# Patient Record
Sex: MALE | Race: BLACK OR AFRICAN AMERICAN
[De-identification: names, ages, dates, MRNs, and addresses within clinical notes are randomized per-mention and may not be internally consistent; named-entity substitution may affect disease eponyms.]

---

## 2019-04-02 ENCOUNTER — HOSPITAL ENCOUNTER (EMERGENCY)
Dept: HOSPITAL 61 - ER | Age: 34
Discharge: HOME | End: 2019-04-02
Payer: COMMERCIAL

## 2019-04-02 VITALS — SYSTOLIC BLOOD PRESSURE: 150 MMHG | DIASTOLIC BLOOD PRESSURE: 82 MMHG

## 2019-04-02 VITALS — HEIGHT: 70 IN | WEIGHT: 310 LBS | BODY MASS INDEX: 44.38 KG/M2

## 2019-04-02 DIAGNOSIS — F32.9: ICD-10-CM

## 2019-04-02 DIAGNOSIS — R09.81: Primary | ICD-10-CM

## 2019-04-02 DIAGNOSIS — F20.9: ICD-10-CM

## 2019-04-02 DIAGNOSIS — R05: ICD-10-CM

## 2019-04-02 PROCEDURE — 99283 EMERGENCY DEPT VISIT LOW MDM: CPT

## 2019-04-02 NOTE — PHYS DOC
Past Medical History


Past Medical History:  Depression, Schizophrenia


 (JULIOCESAR JUSTIN)


Past Surgical History:  No Surgical History


 (JULIOCESAR JUSTIN)


Alcohol Use:  Rarely


Drug Use:  Marijuana


 (JULIOCESAR JUSTIN)





Adult General


Chief Complaint


Chief Complaint:  Congestion





HPI


HPI





32 y/o male presents to ER via POV for c/o cough, congestion and SOA for past 2 

days. He reports he has been exposed to children with flu like illness 

recently. He reports his cough has been nonprod. and denies any CP/

palpitations. He reports he has felt congested and has had sore throat denying 

any difficulty swallowing. He reports sxs have slightly improved since onset 

and he has been using OTC meds such as Vicks, mucinex, and nyquil. He denies N/V

/D or urinary sxs. He denies HA but states he does have some sinus congestion. 

He denies fever with current temp. 97.8. 





Pt is daily smoker. He denies any recent travel. 


 (JULIOCESAR JUSTIN)





Review of Systems


Review of Systems





Constitutional: Denies fever or chills. Reports bodyaches


Eyes: Denies change in visual acuity, redness, or eye pain []


HENT: Reports sinus congestion and sore throat- denies difficulty swallowing


Respiratory: Reports nonprod. cough and SOA- denies SOA increases with exertion


Cardiovascular: Denies CP/tightness


GI: Denies abdominal pain, nausea, vomiting, bloody stools or diarrhea []


: Denies urinary sxs


Musculoskeletal: Denies back/neck pain


Integument: Denies rash, swelling or skin lesions []


Neurologic: Denies headache, focal weakness or sensory changes []


Endocrine: Denies polyuria or polydipsia []





All other systems were reviewed and found to be within normal limits, except as 

documented in this note.


 (JULIOCESAR JUSTIN)





Current Medications


Current Medications





Current Medications








 Medications


  (Trade)  Dose


 Ordered  Sig/Keara  Start Time


 Stop Time Status Last Admin


Dose Admin


 


 Ibuprofen


  (Motrin)  800 mg  1X  ONCE  4/2/19 22:00


 4/2/19 22:00 DC 4/2/19 21:46


800 MG


 


 Oxymetazoline HCl


  (Afrin)  2 spray  1X  ONCE  4/2/19 22:00


 4/2/19 22:00 DC 4/2/19 21:46


2 SPRAY





 (JIM KUO DO)





Allergies


Allergies





Allergies








Coded Allergies Type Severity Reaction Last Updated Verified


 


  No Known Drug Allergies    8/3/15 No





 (JIM KUO DO)





Physical Exam


Physical Exam





Constitutional: Well developed, well nourished, no acute distress, non-toxic 

appearance. []


HENT: Normocephalic, atraumatic, bilateral ears with mild erythema at TM 

without bulging/perforation/purulent drainage, oropharynx moist- smoke odor on 

breath. Mild pharyngeal erythema without swelling- uvula midline, no oral 

exudates, nasal congestion bilat. without drainage- tender in maxillary sinuses 

without facial swelling


Eyes: Pupils equal, conjunctiva normal, no discharge. [] 


Neck: Normal range of motion, no tenderness, supple, no stridor/gross 

adenopathy. Trachea midline


Cardiovascular: Tachycardic heart rate on triage- during exam rechecked with 

regular rhythm in 80-90s, no murmur []


Lungs & Thorax:  Bilateral breath sounds clear to auscultation- resp. equal/

nonlabored. Good air movement throughout all lung fields


Abdomen: Bowel sounds normal, soft, no tenderness, no masses, no pulsatile 

masses. [] 


Skin: Warm, dry, no erythema, no rash. [] 


Back: No tenderness, no CVA tenderness. [] 


Extremities: No tenderness, no cyanosis, no clubbing, ROM intact, no edema. [] 


Neurologic: Alert and oriented X 3, normal motor function, normal sensory 

function, no focal deficits noted. []


Psychologic: Affect normal, judgement normal, mood normal. []


 (JULIOCESAR JUSTIN)





Current Patient Data


Vital Signs





 Vital Signs








  Date Time  Temp Pulse Resp B/P (MAP) Pulse Ox O2 Delivery O2 Flow Rate FiO2


 


4/2/19 20:50 97.8 118 20 150/82 (104) 96 Room Air  





 97.8       





 (JIM KUO DO)





EKG


EKG


[]


 (JULIOCESAR JUSTIN)





Radiology/Procedures


Radiology/Procedures


[]


 (JULIOCESAR JUSTIN)





Course & Med Decision Making


Course & Med Decision Making








Pt was evaluated for generalized cold like illness as had sinus congestion and 

sinus pressure. Plan of care was discussed with pt as his lung sounds were 

clear with good air movement. No cough noted during exam. Pt was afebrile 

although reported he had been exposed to others with flu like illness- flu test 

was offered and he preferred not to have this done. Pt opted for tx of Afrin 

and Ibuprofen while in the ER. After those administered on re-evaluation pt 

reports he is able to breath much better and is feeling much less congested. He 

is in no distress at time of re-eval. Discussed probable viral illness and 

advised on smoking cessation. Education provided on s&s to return to ER for and 

will provide with clinic/physician resource sheet for f/u. Pt advised on 

increasing fluids, Tylenol/ibuprofen PRN, and afrin use advising of not to use 

more than 3 days. Discharge instructions discussed. 


 (JULIOCESAR JUSTIN)





Dragon Disclaimer


Dragon Disclaimer


This electronic medical record was generated, in whole or in part, using a 

voice recognition dictation system.


 (JULIOCESAR JUSTIN)





Departure


Departure


Impression:  


 Primary Impression:  


 Congestion of nasal sinus


 Additional Impression:  


 Cough


Disposition:  01 HOME, SELF-CARE


Condition:  STABLE


Referrals:  


NO PCP (PCP)


Patient Instructions:  Cough, Adult, Sinus Headache, Smoking Cessation





Additional Instructions:  


Drink plenty of fluids. Avoid smoking.





You can take over-the-counter Tylenol and/or ibuprofen for pain and fever 

control. You can use Afrin for 3 days do not use longer as this will cause 

rebound symptoms and irritate your sinuses. If you prefer no sinus spray other 

options are allergy medications- zyrtec/allegra examples. Take as directed on 

container. 





If symptoms persist follow-up with your primary doctor for re-evaluation and 

further care.





Attending Signature


Attending Signature


I have reviewed the PA/NP's note and plan of care. I was available for 

consultation as needed during the patient's visit in the emergency department. 

I agree with the clinical impression, plan, and disposition.


 (JIM KUO DO)





Problem Qualifiers











JULIOCESAR JUSTIN Apr 2, 2019 21:35


JIM KUO DO Apr 21, 2019 17:52

## 2019-06-27 ENCOUNTER — HOSPITAL ENCOUNTER (EMERGENCY)
Dept: HOSPITAL 61 - ER | Age: 34
Discharge: HOME | End: 2019-06-27
Payer: COMMERCIAL

## 2019-06-27 VITALS — WEIGHT: 290 LBS | HEIGHT: 70 IN | BODY MASS INDEX: 41.52 KG/M2

## 2019-06-27 VITALS — SYSTOLIC BLOOD PRESSURE: 144 MMHG | DIASTOLIC BLOOD PRESSURE: 82 MMHG

## 2019-06-27 DIAGNOSIS — F20.9: ICD-10-CM

## 2019-06-27 DIAGNOSIS — H10.31: ICD-10-CM

## 2019-06-27 DIAGNOSIS — F17.200: ICD-10-CM

## 2019-06-27 DIAGNOSIS — H00.011: Primary | ICD-10-CM

## 2019-06-27 PROCEDURE — 99283 EMERGENCY DEPT VISIT LOW MDM: CPT

## 2019-06-27 NOTE — PHYS DOC
Past Medical History


Past Medical History:  No Pertinent History, Depression, Schizophrenia


Past Surgical History:  No Surgical History


Smoking:  Greater than 1 pack/day


Alcohol Use:  Rarely


Drug Use:  Marijuana





Adult General


Chief Complaint


Chief Complaint:  EYE PROBLEMS





HPI


HPI


Patient is a 33-year-old male who presents with eye problems. 3 days ago he 

noticed that his right eye was tender to touch. This morning he woke up and his 

high was glued shut with pus was tender. He is wiped his eye with a wash rag 

this morning to clean it off and it opened. He has no known sick contacts. There

was no trauma and he does not remember getting anything in his eye. He denies 

fever and chills.





Review of Systems


Review of Systems


Constitutional: Denies fever or chills 


Eyes: Reports eye redness and eye pain 


HENT: Denies nasal congestion or sore throat


Respiratory: Denies cough or shortness of breath 


Cardiovascular: Denies chest pain or palpitations


GI: Denies abdominal pain, nausea, or vomiting


: Denies dysuria or hematuria


Musculoskeletal: Denies back pain or joint pain


Integument: Denies rash or skin lesions 


Neurologic: Denies headache, focal weakness or sensory changes





Complete systems were reviewed and found to be within normal limits, except as 

documented in this note.





Current Medications


Current Medications





Current Medications








 Medications


  (Trade)  Dose


 Ordered  Sig/Keara  Start Time


 Stop Time Status Last Admin


Dose Admin


 


 Erythromycin


  (Romycin)  0.25 inch  1X  ONCE  6/27/19 21:45


 6/27/19 21:46 DC 6/27/19 21:45


0.25 INCH


 


 Fluorescein Sodium


  (Ful-Mayi)  1 strip  1X  ONCE  6/27/19 21:45


 6/27/19 22:01 DC  





 


 Tetracaine HCl


  (Tetracaine)  2 drop  1X  ONCE  6/27/19 21:45


 6/27/19 22:01 DC  














Allergies


Allergies





Allergies








Coded Allergies Type Severity Reaction Last Updated Verified


 


  No Known Drug Allergies    8/3/15 No











Physical Exam


Physical Exam


Constitutional: Well developed, well nourished, no acute distress, non-toxic 

appearance


HENT: Normocephalic, atraumatic, oropharynx moist


Eyes: PERRL, EOMI, right conjunctiva mildly erythematous, no discharge, 

hordoleum present on right upper eyelid


Neck: Normal range of motion, no tenderness, supple


Cardiovascular: Heart rate normal, regular rhythm


Lungs & Thorax:  Bilateral breath sounds clear to auscultation, no wheezing


Abdomen: Soft, no tenderness


Skin: Warm, dry, no erythema, no rash


Back: No tenderness, no CVA tenderness


Extremities: No tenderness, ROM intact, no edema


Neurologic: Alert and oriented X 3, normal motor function, normal sensory 

function, no focal deficits noted


Psychologic: Affect normal, judgement normal, mood normal





Current Patient Data


Vital Signs





                                   Vital Signs








  Date Time  Temp Pulse Resp B/P (MAP) Pulse Ox O2 Delivery O2 Flow Rate FiO2


 


6/27/19 21:34 99.0 110 16 144/82 (102) 95 Room Air  





 99.0       











EKG


EKG


[]





Radiology/Procedures


Radiology/Procedures


[]





Course & Med Decision Making


Course & Med Decision Making


Mr. Walden is a 33-year-old male who presents with eye problems. He has a 3 day 

history of right eye tenderness woke up this morning with his eye shut with pus.

 On physical exam his right conjunctiva is mildly erythematous with a hordeolum 

present on upper eyelid. We gave him erythromycin gel and told him to use warm 

compresses. If it does not improve with the gel he should see an optometrist. 


Patient stable for discharge with outpatient follow-up with PCP. Discussed 

findings and plan with patient, who acknowledge understanding and agreement.


[]





Dragon Disclaimer


Dragon Disclaimer


This electronic medical record was generated, in whole or in part, using a voice

 recognition dictation system.





Departure


Departure


Impression:  


   Primary Impression:  


   Conjunctivitis


   Additional Impression:  


   Stye external


Disposition:  01 HOME, SELF-CARE


Condition:  STABLE


Referrals:  


JW MICHAEL MD (PCP)


Patient Instructions:  Conjunctivitis (Viral and Bacterial), Sty


Scripts


Erythromycin Base (Erythromycin) 1 Gm Oint...g.


0.25 INCH OP QID for 5 Days, MISC


   Prov: JIM KUO DO         6/27/19





Problem Qualifiers








   Primary Impression:  


   Conjunctivitis


   Conjunctivitis type:  acute  Acute conjunctivitis type:  unspecified  

   Laterality:  right  Qualified Codes:  H10.31 - Unspecified acute 

   conjunctivitis, right eye


   Additional Impression:  


   Stye external


   Laterality:  right  Eyelid:  unspecified eyelid  Qualified Codes:  H00.013 - 

   Hordeolum externum right eye, unspecified eyelid








JIM KUO DO             Jun 27, 2019 22:04

## 2019-08-05 ENCOUNTER — HOSPITAL ENCOUNTER (EMERGENCY)
Dept: HOSPITAL 61 - ER | Age: 34
Discharge: HOME | End: 2019-08-05
Payer: MEDICAID

## 2019-08-05 VITALS — BODY MASS INDEX: 42.95 KG/M2 | HEIGHT: 69 IN | WEIGHT: 290 LBS

## 2019-08-05 VITALS — SYSTOLIC BLOOD PRESSURE: 157 MMHG | DIASTOLIC BLOOD PRESSURE: 102 MMHG

## 2019-08-05 DIAGNOSIS — L03.113: Primary | ICD-10-CM

## 2019-08-05 DIAGNOSIS — F20.9: ICD-10-CM

## 2019-08-05 DIAGNOSIS — M25.521: ICD-10-CM

## 2019-08-05 PROCEDURE — 73080 X-RAY EXAM OF ELBOW: CPT

## 2019-08-05 PROCEDURE — 99284 EMERGENCY DEPT VISIT MOD MDM: CPT

## 2019-08-05 PROCEDURE — 93971 EXTREMITY STUDY: CPT

## 2019-08-05 NOTE — PHYS DOC
Past Medical History


Past Medical History:  No Pertinent History, Depression, Schizophrenia


Past Surgical History:  No Surgical History


Alcohol Use:  Rarely


Drug Use:  Marijuana





Adult General


Chief Complaint


Chief Complaint:  UPPER EXTREMITY PAIN





HPI


HPI





33-year-old male presents to ER via POV for complaints of right arm pain and 

swelling. Patient states he woke this morning having pain in his right elbow 

extending to his right hand with swelling from his wrist to his hand. She denies

fever. Patient denies any known injury. Patient states he had a little bit of 

tingling sensation in his forearm yesterday but woke this morning with increased

pain and swelling which wasn't there yesterday. Pt denies taking any ove

r-the-counter medications prior to arrival.





Pt is daily smoker. Denies prior blood clot hx or recent travel.





Review of Systems


Review of Systems





Constitutional: Denies fever or chills 


Respiratory: Denies cough or shortness of breath []


Cardiovascular: No additional information not addressed in HPI []


GI: Denies abdominal pain, nausea, vomiting, bloody stools or diarrhea []


: Denies urinary sxs


Musculoskeletal: Denies back pain. Reports rt elbow/forearm/wrist/hand pain with

 swelling in rt wrist/hand


Integument: Denies rash or skin lesions []


Neurologic: Denies headache, focal weakness or sensory changes []





All other systems were reviewed and found to be within normal limits, except as 

documented in this note.





Current Medications


Current Medications





Current Medications








 Medications


  (Trade)  Dose


 Ordered  Sig/Keara  Start Time


 Stop Time Status Last Admin


Dose Admin


 


 Acetaminophen/


 Hydrocodone Bitart


  (Lortab 5/325)  1 tab  1X  ONCE  8/5/19 22:00


 8/5/19 22:01 DC 8/5/19 21:57


1 TAB


 


 Ibuprofen


  (Motrin)  600 mg  1X  ONCE  8/5/19 22:00


 8/5/19 22:01 DC 8/5/19 21:57


600 MG











Allergies


Allergies





Allergies








Coded Allergies Type Severity Reaction Last Updated Verified


 


  No Known Drug Allergies    8/3/15 No











Physical Exam


Physical Exam





Constitutional: Well developed, well nourished, no acute distress, non-toxic 

appearance. []


HENT: Normocephalic, atraumatic, oropharynx moist, nose normal. []


Eyes: Pupils equal, conjunctiva normal, no discharge. [] 


Neck: Normal range of motion, supple


Cardiovascular: Heart rate regular rhythm- 90s during exam had been tachycardic 

at triage, no murmur []


Lungs & Thorax:  Bilateral breath sounds clear to auscultation- resp. equal/n

onlabored


Abdomen: Bowel sounds normal, soft, no tenderness


Skin: Warm, dry, no erythema, no rash. [] 


Back: No tenderness, no CVA tenderness. [] 


Extremities: No cyanosis, no clubbing, ROM intact. 2+ bilat. radial. Tender on 

palp. posterior rt elbow without palp. deformity. Tenderness extends into rt 

forearm/wrist/hand. Rt wrist/hand swelling with warmth in lateral side of rt 

hand- pt is  skin tone difficult to fully assess for cellulitic 

appearance. No induration. No visible wounds. Full  with rt hand. Brisk cap 

refill. 


Neurologic: Alert and oriented X 3, normal motor function, normal sensory 

function, no focal deficits noted. []


Psychologic: Affect normal, judgement normal, mood normal. []





Current Patient Data


Vital Signs





                                   Vital Signs








  Date Time  Temp Pulse Resp B/P (MAP) Pulse Ox O2 Delivery O2 Flow Rate FiO2


 


8/5/19 21:57   16  99 Room Air  


 


8/5/19 21:00 98.6 107  157/102 (120)    





 98.6       











EKG


EKG


[]





Radiology/Procedures


Radiology/Procedures


PROCEDURE: VENOUS UPPER EXTREMITY RIGHT





 


INDICATION: Arm swelling


 


COMPARISON: None.


 


TECHNIQUE: Grayscale, color and doppler ultrasound images were obtained of


the right upper extremity venous vasculature.


 


RIGHT:


 


No thrombus identified in the internal jugular, subclavian, axillary, 


brachial, basilic, cephalic, radial or ulnar veins.


 


 


IMPRESSION:


 


1. No thrombus identified in deep venous system of right upper extremity.


 


Electronically signed by: Anton Murdock MD (8/5/2019 11:21 PM) [facility]














DICTATED and SIGNED BY:     ANTON MURDOCK MD


DATE:     08/05/19 9736





Course & Med Decision Making


Course & Med Decision Making


Pertinent Imaging studies reviewed. (See chart for details)





Patient was evaluated in the ER for complaints of right arm pain with swelling 

in his wrist and hand. Patient denied any known injury. Patient had tenderness 

on exam and right elbow with no obvious deformity x-ray was obtained and was 

reviewed by Dr. Berry with no obvious displaced fractures. Patient had 

ultrasound which was negative for DVT right upper extremity. Discussed imaging 

results with patient as well as probable cellulitis. Discussed plans for home 

discharge with prescription for Keflex. Patient advised on Tylenol and or 

ibuprofen use. Education provided on signs and symptoms to return to ER. 

Discharge instructions were discussed. Patient to follow-up with primary care 

physician if symptoms persist or with any concerns. Patient reported following 

ibuprofen and dose of Norco while in the ER he did have improvement in pain. On 

reexamination he has had no change in appearance of right upper extremity with 

full range of motion. He is PMS intact in right upper extremity.





Dragon Disclaimer


Dragon Disclaimer


This electronic medical record was generated, in whole or in part, using a voice

 recognition dictation system.





Departure


Departure


Impression:  


   Primary Impression:  


   Cellulitis


   Additional Impression:  


   Arm pain, right


Disposition:  01 HOME, SELF-CARE


Condition:  STABLE


Referrals:  


JW MICHAEL MD (PCP)


Patient Instructions:  Cellulitis





Additional Instructions:  


Tylenol and/or ibuprofen as needed for pain as directed on container. 





Follow-up with your primary care physician if symptoms persist and for 

reevaluation as needed or return to the Emergency Room for further care.


Scripts


Cephalexin (KEFLEX) 500 Mg Capsule


1 CAP PO BID, #14 CAP 0 Refills


   Prov: JULIOCESAR JUSTIN         8/5/19





Problem Qualifiers











JULIOCESAR JUSTIN           Aug 5, 2019 23:30

## 2019-08-05 NOTE — RAD
INDICATION: Arm swelling

 

COMPARISON: None.

 

TECHNIQUE: Grayscale, color and doppler ultrasound images were obtained of

the right upper extremity venous vasculature.

 

RIGHT:

 

No thrombus identified in the internal jugular, subclavian, axillary, 

brachial, basilic, cephalic, radial or ulnar veins.

 

 

IMPRESSION:

 

1. No thrombus identified in deep venous system of right upper extremity.

 

Electronically signed by: Anton Linder MD (8/5/2019 11:21 PM) [facility]

## 2019-08-06 NOTE — RAD
Examination: ELBOW RIGHT 3V

 

History: Right elbow pain

 

Comparison/Correlation: None

 

Findings: Total 4 images of the right elbow were obtained.

 

Joint spaces are normal. Nonspecific calcific densities are noted along 

the course of the distal triceps tendon attachment site. Calcification 

also is present just deep to the distal triceps tendon. No joint effusion 

suspected.

 

 

Impression:

Nonspecific calcification of the distal triceps tendon distribution. 

Correlate for possibility of  avulsion injury at the distal triceps tendon

attachment site. Clinically for possibility of calcific tendinitis.

 

No degenerative changes.

 

Electronically signed by: Meng Rivera MD (8/6/2019 9:06 AM) [facility]

## 2019-10-11 ENCOUNTER — HOSPITAL ENCOUNTER (EMERGENCY)
Dept: HOSPITAL 61 - ER | Age: 34
Discharge: HOME | End: 2019-10-11
Payer: MEDICAID

## 2019-10-11 VITALS — WEIGHT: 305 LBS | HEIGHT: 70 IN | BODY MASS INDEX: 43.67 KG/M2

## 2019-10-11 VITALS — DIASTOLIC BLOOD PRESSURE: 107 MMHG | SYSTOLIC BLOOD PRESSURE: 167 MMHG

## 2019-10-11 DIAGNOSIS — M25.571: Primary | ICD-10-CM

## 2019-10-11 PROCEDURE — 73610 X-RAY EXAM OF ANKLE: CPT

## 2019-10-11 PROCEDURE — 96372 THER/PROPH/DIAG INJ SC/IM: CPT

## 2019-10-11 PROCEDURE — 99284 EMERGENCY DEPT VISIT MOD MDM: CPT

## 2019-10-11 NOTE — RAD
ANKLE RIGHT 3V

 

History: Swelling. Pain.

 

Technique: 3 views right ankle.

 

Comparison: None.

 

Findings:

Normal alignment. No fracture. Symmetric ankle mortise. Tiny plantar 

calcaneal spur.

 

Impression: 

1.  No acute osseous abnormality.

 

Electronically signed by: Basilio Dennis DO (10/11/2019 5:24 PM) [facility]

## 2019-10-11 NOTE — PHYS DOC
Past Medical History


Past Medical History:  Schizophrenia


 (JIM MICHELLE)


Past Surgical History:  No Surgical History


 (JIM MICHELLE)


Alcohol Use:  None


Drug Use:  None


 (JIM MICHELLE)


Attending Signature


I have participated in the care of this patient and I have reviewed and agree 

with all pertinent clinical information above including history, exam, and 

recommendations.





 (AUBREE GERMAN MD)





Adult General


Chief Complaint


Chief Complaint:  LOWER EXT PAIN





HPI


HPI





Patient is a 34  year old male who presents with right ankle pain has been 

ongoing for 3 days. The patient states his pain levels 10 out of 10 in severity 

and he took an ibuprofen around 10:30 yesterday which did not help. Denies any 

trauma to the extremity.


 (JIM MICHELLE)





Review of Systems


Review of Systems





Constitutional: Denies fever or chills []


Eyes: Denies change in visual acuity, redness, or eye pain []


HENT: Denies nasal congestion or sore throat []


Respiratory: Denies cough or shortness of breath []


Cardiovascular: No additional information not addressed in HPI []


GI: Denies abdominal pain, nausea, vomiting, bloody stools or diarrhea []


: Denies dysuria or hematuria []


Musculoskeletal: Reports R ankle pain.


Integument: Denies rash or skin lesions []


Neurologic: Denies headache, focal weakness or sensory changes []


Endocrine: Denies polyuria or polydipsia []





Complete systems were reviewed and found to be within normal limits, except as 

documented in this note.


 (JIM MICHELLE)





Current Medications


Current Medications





Current Medications








 Medications


  (Trade)  Dose


 Ordered  Sig/Keara  Start Time


 Stop Time Status Last Admin


Dose Admin


 


 Ketorolac


 Tromethamine


  (Toradol 30mg


 Vial)  30 mg  1X  STAT  10/11/19 16:55


 10/11/19 16:59 DC 10/11/19 17:15


30 MG





 (AUBREE GERMAN MD)





Allergies


Allergies





Allergies








Coded Allergies Type Severity Reaction Last Updated Verified


 


  No Known Drug Allergies    8/3/15 No





 (AUBREE GERMAN MD)





Physical Exam


Physical Exam





Constitutional: Well developed, well nourished, no acute distress, non-toxic 

appearance. []


HENT: Normocephalic, atraumatic, bilateral external ears normal, oropharynx 

moist, no oral exudates, nose normal. []


Eyes: PERRLA, EOMI, conjunctiva normal, no discharge. [] 


Neck: Normal range of motion, no tenderness, supple, no stridor. [] 


Cardiovascular:Heart rate regular rhythm, no murmur []


Lungs & Thorax:  Bilateral breath sounds clear to auscultation []


Abdomen: Bowel sounds normal, soft, no tenderness, no masses, no pulsatile 

masses. [] 


Skin: Warm, dry, no erythema, no rash. [] 


Back: No tenderness, no CVA tenderness. [] 


Extremities: Diffuse tenderness to R ankle. Mild edema. Neurovascular status 

intact. Pedal pulses equal bilaterally.


Neurologic: Alert and oriented X 3, normal motor function, normal sensory 

function, no focal deficits noted. []


Psychologic: Affect normal, judgement normal, mood normal. []


 (JIM MICHELLE)





Current Patient Data


Vital Signs





                                   Vital Signs








  Date Time  Temp Pulse Resp B/P (MAP) Pulse Ox O2 Delivery O2 Flow Rate FiO2


 


10/11/19 16:40 98.6 107 16 167/107 (127) 94 Room Air  





 98.6       





 (AUBREE GERMAN MD)





EKG


EKG


[]


 (JIM MICHELLE)





Radiology/Procedures


Radiology/Procedures


X-ray interpreted by Dr. German





No obvious acute abnormalities.[]


 (JIM MICHELLE)





Course & Med Decision Making


Course & Med Decision Making


Pertinent Labs and Imaging studies reviewed. (See chart for details)





Will get X-ray and give Toradol.


 (JIM MICHELLE)





Dragon Disclaimer


Dragon Disclaimer


This electronic medical record was generated, in whole or in part, using a voice

 recognition dictation system.


 (JIM MICHELLE)





Departure


Departure


Impression:  


   Primary Impression:  


   Ankle pain


Disposition:  01 HOME, SELF-CARE


Condition:  STABLE


Referrals:  


JW MICHAEL MD (PCP)


Patient Instructions:  Ankle Pain





Additional Instructions:  


Thank you for visiting . We appreciate you trusting us 

with your care. If any additional problems come up don't hesitate to return to 

visit us. Please follow up with your primary care provider so they can plan ad

ditional care if needed and know about the problem that you had. If symptoms 

worsen come back to the Emergency Department. Any concerning symptoms that start

 such as chest pain, shortness of air, weakness or numbness on one side of the 

body, running high fevers or any other concerning symptoms return to the ER.





Problem Qualifiers








   Primary Impression:  


   Ankle pain


   Chronicity:  acute  Laterality:  right  Qualified Codes:  M25.571 - Pain in 

   right ankle and joints of right foot








JIM MICHELLE          Oct 11, 2019 17:05


AUBREE GERMAN MD              Oct 12, 2019 09:37

## 2020-05-10 ENCOUNTER — HOSPITAL ENCOUNTER (EMERGENCY)
Dept: HOSPITAL 61 - ER | Age: 35
Discharge: HOME | End: 2020-05-10
Payer: MEDICAID

## 2020-05-10 VITALS — WEIGHT: 310.63 LBS | HEIGHT: 71 IN | BODY MASS INDEX: 43.49 KG/M2

## 2020-05-10 VITALS — DIASTOLIC BLOOD PRESSURE: 99 MMHG | SYSTOLIC BLOOD PRESSURE: 141 MMHG

## 2020-05-10 DIAGNOSIS — F32.9: ICD-10-CM

## 2020-05-10 DIAGNOSIS — F20.9: ICD-10-CM

## 2020-05-10 DIAGNOSIS — K76.0: ICD-10-CM

## 2020-05-10 DIAGNOSIS — R07.81: ICD-10-CM

## 2020-05-10 DIAGNOSIS — M94.0: Primary | ICD-10-CM

## 2020-05-10 LAB
ALBUMIN SERPL-MCNC: 3.7 G/DL (ref 3.4–5)
ALBUMIN/GLOB SERPL: 1 {RATIO} (ref 1–1.7)
ALP SERPL-CCNC: 90 U/L (ref 46–116)
ALT SERPL-CCNC: 20 U/L (ref 16–63)
ANION GAP SERPL CALC-SCNC: 8 MMOL/L (ref 6–14)
APTT PPP: YELLOW S
AST SERPL-CCNC: 14 U/L (ref 15–37)
BACTERIA #/AREA URNS HPF: 0 /HPF
BASOPHILS # BLD AUTO: 0.1 X10^3/UL (ref 0–0.2)
BASOPHILS NFR BLD: 1 % (ref 0–3)
BILIRUB SERPL-MCNC: 0.3 MG/DL (ref 0.2–1)
BILIRUB UR QL STRIP: NEGATIVE
BUN SERPL-MCNC: 5 MG/DL (ref 8–26)
BUN/CREAT SERPL: 6 (ref 6–20)
CALCIUM SERPL-MCNC: 9.2 MG/DL (ref 8.5–10.1)
CHLORIDE SERPL-SCNC: 104 MMOL/L (ref 98–107)
CO2 SERPL-SCNC: 31 MMOL/L (ref 21–32)
CREAT SERPL-MCNC: 0.9 MG/DL (ref 0.7–1.3)
EOSINOPHIL NFR BLD: 0.2 X10^3/UL (ref 0–0.7)
EOSINOPHIL NFR BLD: 1 % (ref 0–3)
ERYTHROCYTE [DISTWIDTH] IN BLOOD BY AUTOMATED COUNT: 14.7 % (ref 11.5–14.5)
FIBRINOGEN PPP-MCNC: CLEAR MG/DL
GFR SERPLBLD BASED ON 1.73 SQ M-ARVRAT: 116.2 ML/MIN
GLOBULIN SER-MCNC: 3.7 G/DL (ref 2.2–3.8)
GLUCOSE SERPL-MCNC: 108 MG/DL (ref 70–99)
HCT VFR BLD CALC: 43.3 % (ref 39–53)
HGB BLD-MCNC: 14.2 G/DL (ref 13–17.5)
LIPASE: 118 U/L (ref 73–393)
LYMPHOCYTES # BLD: 3.4 X10^3/UL (ref 1–4.8)
LYMPHOCYTES NFR BLD AUTO: 21 % (ref 24–48)
MCH RBC QN AUTO: 28 PG (ref 25–35)
MCHC RBC AUTO-ENTMCNC: 33 G/DL (ref 31–37)
MCV RBC AUTO: 86 FL (ref 79–100)
MONO #: 1.4 X10^3/UL (ref 0–1.1)
MONOCYTES NFR BLD: 9 % (ref 0–9)
NEUT #: 11 X10^3/UL (ref 1.8–7.7)
NEUTROPHILS NFR BLD AUTO: 69 % (ref 31–73)
NITRITE UR QL STRIP: NEGATIVE
PH UR STRIP: 7 [PH]
PLATELET # BLD AUTO: 259 X10^3/UL (ref 140–400)
POTASSIUM SERPL-SCNC: 4.5 MMOL/L (ref 3.5–5.1)
PROT SERPL-MCNC: 7.4 G/DL (ref 6.4–8.2)
PROT UR STRIP-MCNC: NEGATIVE MG/DL
RBC # BLD AUTO: 5.06 X10^6/UL (ref 4.3–5.7)
RBC #/AREA URNS HPF: (no result) /HPF (ref 0–2)
SODIUM SERPL-SCNC: 143 MMOL/L (ref 136–145)
SQUAMOUS #/AREA URNS LPF: (no result) /LPF
UROBILINOGEN UR-MCNC: >=8 MG/DL
WBC # BLD AUTO: 16.1 X10^3/UL (ref 4–11)
WBC #/AREA URNS HPF: (no result) /HPF (ref 0–4)

## 2020-05-10 PROCEDURE — 80053 COMPREHEN METABOLIC PANEL: CPT

## 2020-05-10 PROCEDURE — 81001 URINALYSIS AUTO W/SCOPE: CPT

## 2020-05-10 PROCEDURE — 71045 X-RAY EXAM CHEST 1 VIEW: CPT

## 2020-05-10 PROCEDURE — 96375 TX/PRO/DX INJ NEW DRUG ADDON: CPT

## 2020-05-10 PROCEDURE — 76705 ECHO EXAM OF ABDOMEN: CPT

## 2020-05-10 PROCEDURE — 83690 ASSAY OF LIPASE: CPT

## 2020-05-10 PROCEDURE — 99285 EMERGENCY DEPT VISIT HI MDM: CPT

## 2020-05-10 PROCEDURE — 36415 COLL VENOUS BLD VENIPUNCTURE: CPT

## 2020-05-10 PROCEDURE — 96374 THER/PROPH/DIAG INJ IV PUSH: CPT

## 2020-05-10 PROCEDURE — 85025 COMPLETE CBC W/AUTO DIFF WBC: CPT

## 2020-05-10 PROCEDURE — 85379 FIBRIN DEGRADATION QUANT: CPT

## 2020-05-10 NOTE — RAD
PORTABLE CHEST 1V

 

History: Chest wall pain.

 

Comparison: August 3, 2015

 

Findings:

Minimal left midlung linear atelectasis. No consolidation or pleural 

effusion. Normal heart size. No pneumothorax.

 

Impression: 

1.  No acute cardiopulmonary process.

 

Electronically signed by: Basilio Dennis DO (5/10/2020 7:26 PM) [facility]

## 2020-05-10 NOTE — RAD
ABDOMEN LTD

 

History: Upper quadrant pain.

 

Comparison: None.

 

Technique: Transabdominal ultrasound images are obtained of the right 

upper quadrant.

 

Findings:

Visualized pancreas is not well seen due to overlying bowel gas.  

 

Liver is increased in echogenicity. Right hepatic lobe measures 19.3 cm.  

Portal flow is hepatopedal.

 

Mild gallbladder sludge. No gallbladder wall thickening. No 

pericholecystic fluid. No cholelithiasis.  

 

Common bile duct measures 4.5 mm in diameter. 

 

The right kidney measures 11.0 x 5.6 x 4.7 cm. No hydronephrosis.

 

Visualized portions of the aorta and IVC have normal caliber.

 

IMPRESSION: 

1.  Gallbladder sludge.

2.  Hepatomegaly with increased echotexture, may indicate steatosis.

 

Electronically signed by: Basilio Dennis DO (5/10/2020 9:47 PM) [facility]

## 2020-05-10 NOTE — PHYS DOC
Past Medical History


Past Medical History:  Depression, Schizophrenia


Past Surgical History:  No Surgical History


Smoking Status:  Never Smoker


Alcohol Use:  None


Drug Use:  None





General Adult


EDM:


Chief Complaint:  SHORTNESS OF BREATH





HPI:


HPI:





Patient is a 34  year old male who presents with complaint of right-sided rib 

pain for the last 2 to 3 days.  Patient states that pain is sharp in nature and 

states that it is worsened with deep breathing.  He states that he does have 

some shortness of breath, primarily because it hurts to breathe.  He denies any 

nausea, vomiting or diaphoresis.  He denies any precordial chest pain.  Patient 

denies any recent injuries.  He has had no abdominal pain.  []





Review of Systems:


Review of Systems:


Constitutional:  Denies fever or chills. []


Respiratory: Admits to shortness of breath. [] 


Cardiovascular:  Denies chest pain or edema. [] 


GI:  Denies abdominal pain, nausea, vomiting or diarrhea. [] 


Integument:  Denies rash. [] 


Neurologic:  Denies headache, focal weakness or sensory changes. [] 





A full 10 point review of systems has been reviewed and is otherwise negative.





Heart Score:


Risk Factors:


Risk Factors:  DM, Current or recent (<one month) smoker, HTN, HLP, family 

history of CAD, obesity.


Risk Scores:


Score 0 - 3:  2.5% MACE over next 6 weeks - Discharge Home


Score 4 - 6:  20.3% MACE over next 6 weeks - Admit for Clinical Observation


Score 7 - 10:  72.7% MACE over next 6 weeks - Early Invasive Strategies





Current Medications:





Current Medications








 Medications


  (Trade)  Dose


 Ordered  [facility]  Start Time


 Stop Time Status Last Admin


Dose Admin


 


 Fentanyl Citrate


  (Fentanyl 2ml


 Vial)  50 mcg  PRN Q15MIN  PRN  5/10/20 19:00


 5/11/20 18:59 UNV  





 


 Ondansetron HCl


  (Zofran)  4 mg  1X  ONCE  5/10/20 19:00


 5/10/20 19:01 UNV  





 


 Sodium Chloride  1,000 ml @ 


 1,000 mls/hr  Q1H  5/10/20 18:52


 5/10/20 19:51 UNV  














Allergies:


Allergies:





Allergies








Coded Allergies Type Severity Reaction Last Updated Verified


 


  No Known Drug Allergies    8/3/15 No











Physical Exam:


PE:





Constitutional: Well developed, well nourished, no acute distress, non-toxic 

appearance. []


HENT: Normocephalic, atraumatic, bilateral external ears normal, oropharynx 

moist, no oral exudates, nose normal. []


Eyes: PERRLA, EOMI, conjunctiva normal, no discharge. [] 


Neck: Normal range of motion, no tenderness, supple, no stridor. [] 


Cardiovascular: Regular rate and rhythm []


Lungs & Thorax:  Bilateral breath sounds clear to auscultation []


Abdomen: Bowel sounds normal, soft, no tenderness. [] 


Skin: Warm, dry, no erythema, no rash. [] 


Extremities: No tenderness, no cyanosis, no clubbing, ROM intact. [] 


Neurologic: Alert and oriented X 3, no focal deficits noted. []





Current Patient Data:


Vital Signs:





                                   Vital Signs








  Date Time  Temp Pulse Resp B/P (MAP) Pulse Ox O2 Delivery O2 Flow Rate FiO2


 


5/10/20 18:44 98.9 94 20 165/119 (134) 97 Room Air  





 98.9       











EKG:


EKG:


[]





Radiology/Procedures:


Radiology/Procedures:


[]


Impression:


PROCEDURE: PORTABLE CHEST 1V





PORTABLE CHEST 1V


 


History: Chest wall pain.


 


Comparison: August 3, 2015


 


Findings:


Minimal left midlung linear atelectasis. No consolidation or pleural 


effusion. Normal heart size. No pneumothorax.


 


Impression: 


1.  No acute cardiopulmonary process.


 


Electronically signed by: Basilio Dennis DO (5/10/2020 7:26 PM) [facility]





Course & Med Decision Making:


Course & Med Decision Making


Pertinent Labs and Imaging studies reviewed. (See chart for details)





[]





Dragon Disclaimer:


Dragon Disclaimer:


This electronic medical record was generated, in whole or in part, using a voice

 recognition dictation system.





Departure


Departure


Impression:  


   Primary Impression:  


   Costochondritis


   Additional Impression:  


   Fatty (change of) liver, not elsewhere classified


Disposition:  01 HOME, SELF-CARE


Condition:  STABLE


Referrals:  


JW MICHAEL MD (PCP)


Patient Instructions:  Costochondritis


Scripts


Acetaminophen With Codeine (TYLENOL WITH CODEINE #3 TABLET) 1 Each Tablet


1 TAB PO PRN Q6HRS PRN for PAIN, #12 TAB


   Prov: VICTOR HUGO KERN Jr. DO         5/10/20 


Diclofenac Sodium (DICLOFENAC SODIUM) 50 Mg Tablet.dr


1 TAB PO BID PRN for PAIN, #20 TAB


   Prov: VICTOR HUGO KERN Jr. DO         5/10/20











VICTOR HUGO KERN Jr. DO          May 10, 2020 19:03